# Patient Record
Sex: MALE | Race: WHITE
[De-identification: names, ages, dates, MRNs, and addresses within clinical notes are randomized per-mention and may not be internally consistent; named-entity substitution may affect disease eponyms.]

---

## 2019-03-11 ENCOUNTER — HOSPITAL ENCOUNTER (EMERGENCY)
Dept: HOSPITAL 87 - ER | Age: 64
Discharge: HOME | End: 2019-03-11
Payer: COMMERCIAL

## 2019-03-11 VITALS — SYSTOLIC BLOOD PRESSURE: 140 MMHG | DIASTOLIC BLOOD PRESSURE: 64 MMHG

## 2019-03-11 VITALS — WEIGHT: 211.64 LBS | BODY MASS INDEX: 28.67 KG/M2 | HEIGHT: 72 IN

## 2019-03-11 DIAGNOSIS — E04.1: ICD-10-CM

## 2019-03-11 DIAGNOSIS — R03.0: ICD-10-CM

## 2019-03-11 DIAGNOSIS — J18.9: Primary | ICD-10-CM

## 2019-03-11 DIAGNOSIS — E78.5: ICD-10-CM

## 2019-03-11 LAB
APPEARANCE UR: CLEAR
APTT PPP: 27.3 SEC (ref 23.4–31)
BASOPHILS NFR BLD AUTO: 0.4 % (ref 0–2)
CHLORIDE SERPL-SCNC: 106 MEQ/L (ref 98–107)
COLOR UR: YELLOW
D DIMER PPP FEU-MCNC: 0.72 MG/L FEU (ref ?–0.5)
EOSINOPHIL NFR BLD AUTO: 1.9 % (ref 0–5)
ERYTHROCYTE [DISTWIDTH] IN BLOOD BY AUTOMATED COUNT: 13.5 % (ref 11.6–14.6)
HCT VFR BLD AUTO: 44.2 % (ref 42–52)
HGB BLD-MCNC: 14.9 G/DL (ref 14–18)
HGB UR QL STRIP: NEGATIVE
INR PPP: 1
KETONES UR STRIP-MCNC: NEGATIVE MG/DL
LEUKOCYTE ESTERASE UR QL STRIP: NEGATIVE
LYMPHOCYTES NFR BLD AUTO: 20.5 % (ref 20–50)
MCH RBC QN AUTO: 31.1 PG (ref 28–32)
MCV RBC AUTO: 92.1 FL (ref 80–94)
MONOCYTES NFR BLD AUTO: 9.8 % (ref 2–8)
NEUTROPHILS NFR BLD AUTO: 67.4 % (ref 40–76)
NITRITE UR QL STRIP: NEGATIVE
PH UR STRIP: 5 [PH] (ref 4.5–8)
PLATELET # BLD AUTO: 155 X1000/UL (ref 130–400)
PMV BLD AUTO: 9 FL (ref 7.4–10.4)
PROT UR QL STRIP: NEGATIVE
PROTHROMBIN TIME: 9.6 SEC (ref 9.1–11.1)
RBC # BLD AUTO: 4.79 MILL/UL (ref 4.7–6.1)
SP GR UR STRIP: 1.01 (ref 1–1.03)
UROBILINOGEN UR STRIP-MCNC: 0.2 E.U./DL (ref 0.2–1)

## 2019-03-11 PROCEDURE — 85025 COMPLETE CBC W/AUTO DIFF WBC: CPT

## 2019-03-11 PROCEDURE — 93005 ELECTROCARDIOGRAM TRACING: CPT

## 2019-03-11 PROCEDURE — 36415 COLL VENOUS BLD VENIPUNCTURE: CPT

## 2019-03-11 PROCEDURE — 71045 X-RAY EXAM CHEST 1 VIEW: CPT

## 2019-03-11 PROCEDURE — 99284 EMERGENCY DEPT VISIT MOD MDM: CPT

## 2019-03-11 PROCEDURE — 74176 CT ABD & PELVIS W/O CONTRAST: CPT

## 2019-03-11 PROCEDURE — 93970 EXTREMITY STUDY: CPT

## 2019-03-11 PROCEDURE — 85730 THROMBOPLASTIN TIME PARTIAL: CPT

## 2019-03-11 PROCEDURE — 96374 THER/PROPH/DIAG INJ IV PUSH: CPT

## 2019-03-11 PROCEDURE — 85610 PROTHROMBIN TIME: CPT

## 2019-03-11 PROCEDURE — 71275 CT ANGIOGRAPHY CHEST: CPT

## 2019-03-11 PROCEDURE — 80053 COMPREHEN METABOLIC PANEL: CPT

## 2019-03-11 PROCEDURE — 83880 ASSAY OF NATRIURETIC PEPTIDE: CPT

## 2019-03-11 PROCEDURE — 85379 FIBRIN DEGRADATION QUANT: CPT

## 2019-03-11 PROCEDURE — 81003 URINALYSIS AUTO W/O SCOPE: CPT

## 2019-03-11 PROCEDURE — 84484 ASSAY OF TROPONIN QUANT: CPT

## 2024-08-19 ENCOUNTER — NON-APPOINTMENT (OUTPATIENT)
Age: 69
End: 2024-08-19

## 2024-08-20 ENCOUNTER — EMERGENCY (EMERGENCY)
Facility: HOSPITAL | Age: 69
LOS: 0 days | Discharge: HOME | End: 2024-08-20
Attending: STUDENT IN AN ORGANIZED HEALTH CARE EDUCATION/TRAINING PROGRAM
Payer: COMMERCIAL

## 2024-08-20 VITALS
DIASTOLIC BLOOD PRESSURE: 76 MMHG | RESPIRATION RATE: 18 BRPM | OXYGEN SATURATION: 99 % | TEMPERATURE: 98 F | SYSTOLIC BLOOD PRESSURE: 170 MMHG | HEART RATE: 58 BPM

## 2024-08-20 VITALS
TEMPERATURE: 98 F | SYSTOLIC BLOOD PRESSURE: 164 MMHG | DIASTOLIC BLOOD PRESSURE: 79 MMHG | WEIGHT: 231.71 LBS | OXYGEN SATURATION: 97 % | RESPIRATION RATE: 17 BRPM | HEART RATE: 61 BPM

## 2024-08-20 DIAGNOSIS — L02.11 CUTANEOUS ABSCESS OF NECK: ICD-10-CM

## 2024-08-20 PROCEDURE — 99284 EMERGENCY DEPT VISIT MOD MDM: CPT | Mod: 25

## 2024-08-20 PROCEDURE — 10060 I&D ABSCESS SIMPLE/SINGLE: CPT

## 2024-08-20 PROCEDURE — 10061 I&D ABSCESS COMP/MULTIPLE: CPT

## 2024-08-20 PROCEDURE — 99283 EMERGENCY DEPT VISIT LOW MDM: CPT | Mod: 25

## 2024-08-20 RX ORDER — OXYCODONE HYDROCHLORIDE 30 MG/1
1 TABLET ORAL
Qty: 9 | Refills: 0
Start: 2024-08-20 | End: 2024-08-22

## 2024-08-20 NOTE — ED ADULT NURSE NOTE - OBJECTIVE STATEMENT
Pt presents to ED for neck cyst. pt was seen at urgent care and is on PO abx. Denies fevers/ chills.AxOx4, ambulatory

## 2024-08-20 NOTE — ED STATDOCS - CARE PROVIDER_API CALL
Sid Wilson  [address], [address] [address]  Phone: [phone]  Fax: [phone]  Follow Up Time:

## 2024-08-20 NOTE — ED ADULT TRIAGE NOTE - CHIEF COMPLAINT QUOTE
PT comes to the ED for neck cyst. pt was seen at urgent care and is on abx wants it checked out. denies fevers/ chills. no other complaints at this time. VAL

## 2024-08-20 NOTE — ED STATDOCS - PATIENT PORTAL LINK FT
You can access the FollowMyHealth Patient Portal offered by [facility] by registering at the following website: [email or web address]. By joining [facility]’s FollowMyHealth portal, you will also be able to view your health information using other applications (apps) compatible with our system.

## 2024-08-20 NOTE — ED STATDOCS - NSFOLLOWUPINSTRUCTIONS_ED_ALL_ED_FT
Abscess Incision and Drainage    WHAT YOU NEED TO KNOW:    What do I need to know about an abscess incision and drainage? An abscess incision and drainage (I and D) is a procedure to drain pus from an abscess and clean it out so it can heal.    How do I prepare for an I and D? Your healthcare provider will talk to you about how to prepare for an I and D. He or she will tell you what medicines to take or not take on the day of your I and D.    What will happen during an I and D? You will be given local or regional anesthesia to numb the area and keep you free from pain. Your healthcare provider will make an incision in your skin near or over the abscess. He or she will press on the area to drain the pus. A cotton swab or other medical tool wrapped in gauze may be used to clean the inside of the abscess. Your healthcare provider may wash the wound with saline (salt water). He or she may place plain gauze or gauze with medicine in your wound to help it heal. A dry bandage will be placed over your wound. You may be given antibiotics to treat the infection.    What are the risks of an I and D? A scar may form on your skin as it heals. Your incision may heal slowly, feel painful, or get infected. Your abscess may come back, even after treatment. You may need another I and D if the abscess comes back. The bacteria may spread to your heart or other organs. This can be life-threatening.    CARE AGREEMENT:    You have the right to help plan your care. Learn about your health condition and how it may be treated. Discuss treatment options with your healthcare providers to decide what care you want to receive. You always have the right to refuse treatment.

## 2024-08-20 NOTE — ED STATDOCS - PROGRESS NOTE DETAILS
Fluctuant 68 year old male with PMHx of HLD on Crestor; presents to ED c/o neck abscess. Patient reports hx of cyst in the same area many years ago, had it removed and has not caused issues for some time. Patient prescribed Doxycycline x1 week for cyst. Denies fever.  Petra Milligan PA-C Fluctuant abscess inferior neck.  I&D. Petra Milligan PA-C

## 2024-08-20 NOTE — ED STATDOCS - CLINICAL SUMMARY MEDICAL DECISION MAKING FREE TEXT BOX
68-year-old male with past medical history of hypertension presents ED complaining of possible abscess to the right lateral posterior neck.  Patient's had something similar in the past and had a removed.  Patient was seen in urgent care in [place] and prescribed doxycycline but did not have it drained at that time.  Patient on exam has had area of approximately 1 x 1 cm with tenderness, induration, fluctuance concerning for abscess.  Will perform incision and drainage and discharge patient to follow-up with dermatology.

## 2024-08-20 NOTE — ED STATDOCS - OBJECTIVE STATEMENT
68 year old male with PMHx of HLD on Crestor; presents to ED c/o neck abscess. Patient reports hx of cyst in the same area many years ago, had it removed and has not caused issues for some time. Patient prescribed Doxycycline x1 week for cyst. Denies fever.

## 2024-08-20 NOTE — ED STATDOCS - PHYSICAL EXAMINATION
GEN: Patient awake alert NAD.   HEENT: normocephalic, atraumatic, EOMI, no scleral icterus, moist MM. 1x1 cm abscess on posterolateral neck with mild surrounding erythema, tenderness, fluctuance and induration   CARDIAC: RRR, S1, S2, no murmur.   PULM: CTA B/L no wheeze, rhonchi, rales.   ABD: soft NT, ND, no rebound no guarding, no CVA tenderness.   MSK: Moving all extremities, no edema. 5/5 strength and full ROM in all extremities.     NEURO: A&Ox3, gait normal, no focal neurological deficits, CN 2-12 grossly intact  SKIN: warm, dry, no rash. GEN: Patient awake alert NAD.   HEENT: normocephalic, atraumatic, EOMI, no scleral icterus, moist MM. 1x1 cm abscess on posterolateral neck with mild surrounding erythema, tenderness, fluctuance and induration   CARDIAC: RRR, S1, S2, no murmur.   PULM: CTA B/L no wheeze, rhonchi, rales.   ABD: soft NT, ND, no rebound no guarding, no CVA tenderness.   MSK: Moving all extremities, no edema. 5/5 strength and full ROM in all extremities.     NEURO: A&Ox3, gait normal, no focal neurological deficits, CN 2-12 grossly intact  SKIN: warm, dry, GEN: Patient awake alert NAD.   HEENT: normocephalic, atraumatic, EOMI, no scleral icterus, moist MM. 1x1 cm abscess on posterolateral neck with mild surrounding erythema, tenderness, fluctuance and induration   CARDIAC: RRR, S1, S2, no murmur.   PULM: CTA B/L no wheeze, rhonchi, rales.   ABD: soft NT, ND, no rebound no guarding  MSK: Moving all extremities, no edema. 5/5 strength and full ROM in all extremities.     NEURO: A&Ox3, gait normal, no focal neurological deficits  SKIN: warm, dry,

## 2024-08-21 ENCOUNTER — NON-APPOINTMENT (OUTPATIENT)
Age: 69
End: 2024-08-21

## 2024-08-21 PROBLEM — Z00.00 ENCOUNTER FOR PREVENTIVE HEALTH EXAMINATION: Status: ACTIVE | Noted: 2024-08-21

## 2024-08-22 ENCOUNTER — NON-APPOINTMENT (OUTPATIENT)
Age: 69
End: 2024-08-22

## 2024-08-22 ENCOUNTER — APPOINTMENT (OUTPATIENT)
Dept: DERMATOLOGY | Facility: CLINIC | Age: 69
End: 2024-08-22
Payer: COMMERCIAL

## 2024-08-22 VITALS — WEIGHT: 214 LBS | BODY MASS INDEX: 28.99 KG/M2 | HEIGHT: 72 IN

## 2024-08-22 DIAGNOSIS — L72.3 SEBACEOUS CYST: ICD-10-CM

## 2024-08-22 DIAGNOSIS — Z85.820 PERSONAL HISTORY OF MALIGNANT MELANOMA OF SKIN: ICD-10-CM

## 2024-08-22 DIAGNOSIS — Z78.9 OTHER SPECIFIED HEALTH STATUS: ICD-10-CM

## 2024-08-22 PROCEDURE — 99204 OFFICE O/P NEW MOD 45 MIN: CPT

## 2024-08-22 RX ORDER — KETOCONAZOLE 20.5 MG/ML
2 SHAMPOO, SUSPENSION TOPICAL
Refills: 0 | Status: ACTIVE | COMMUNITY

## 2024-08-22 RX ORDER — ROSUVASTATIN CALCIUM 10 MG/1
10 TABLET, FILM COATED ORAL
Refills: 0 | Status: ACTIVE | COMMUNITY

## 2024-08-22 RX ORDER — KETOCONAZOLE 20 MG/G
2 CREAM TOPICAL
Refills: 0 | Status: ACTIVE | COMMUNITY

## 2024-08-22 NOTE — PHYSICAL EXAM
[FreeTextEntry3] : Cystic nodule - base of the right posterior neck, with crusted packing on the surface.

## 2024-08-22 NOTE — HISTORY OF PRESENT ILLNESS
[FreeTextEntry1] : Patient sent by ER for lesion on the posterior neck. [de-identified] : He had a tender and growing cyst, that was drained and packed in the ER 1 week ago.

## 2024-08-22 NOTE — ASSESSMENT
[FreeTextEntry1] : Irritated EIC s/p I&D in ER. Complete antibiotics as given in ER (? name of medication). Dry dressing encouraged, clean at least daily in the shower, removing any crusting. f/u in 3 weeks for evaluation.  If there is recurrent cyst, will then schedule for excision.

## 2024-09-10 ENCOUNTER — APPOINTMENT (OUTPATIENT)
Dept: DERMATOLOGY | Facility: CLINIC | Age: 69
End: 2024-09-10
Payer: COMMERCIAL

## 2024-09-10 DIAGNOSIS — L72.3 SEBACEOUS CYST: ICD-10-CM

## 2024-09-10 PROCEDURE — 99213 OFFICE O/P EST LOW 20 MIN: CPT

## 2024-09-10 NOTE — HISTORY OF PRESENT ILLNESS
[FreeTextEntry1] : Irritated EIC of the right posterior neck. [de-identified] : Improved, as it healed, but not resolved.

## 2024-09-10 NOTE — PHYSICAL EXAM
[Alert] : alert [Oriented x 3] : ~L oriented x 3 [Well Nourished] : well nourished [FreeTextEntry3] : Small erythematous cystic nodule.

## 2024-09-18 ENCOUNTER — NON-APPOINTMENT (OUTPATIENT)
Age: 69
End: 2024-09-18

## 2024-09-18 ENCOUNTER — APPOINTMENT (OUTPATIENT)
Dept: FAMILY MEDICINE | Facility: CLINIC | Age: 69
End: 2024-09-18
Payer: COMMERCIAL

## 2024-09-18 VITALS
OXYGEN SATURATION: 97 % | DIASTOLIC BLOOD PRESSURE: 74 MMHG | BODY MASS INDEX: 30.88 KG/M2 | SYSTOLIC BLOOD PRESSURE: 124 MMHG | WEIGHT: 228 LBS | HEIGHT: 72 IN | HEART RATE: 59 BPM | TEMPERATURE: 97.6 F

## 2024-09-18 DIAGNOSIS — M25.511 PAIN IN RIGHT SHOULDER: ICD-10-CM

## 2024-09-18 DIAGNOSIS — Z85.820 PERSONAL HISTORY OF MALIGNANT MELANOMA OF SKIN: ICD-10-CM

## 2024-09-18 DIAGNOSIS — Z00.00 ENCOUNTER FOR GENERAL ADULT MEDICAL EXAMINATION W/OUT ABNORMAL FINDINGS: ICD-10-CM

## 2024-09-18 DIAGNOSIS — E78.5 HYPERLIPIDEMIA, UNSPECIFIED: ICD-10-CM

## 2024-09-18 PROCEDURE — 99387 INIT PM E/M NEW PAT 65+ YRS: CPT

## 2024-09-18 PROCEDURE — 81003 URINALYSIS AUTO W/O SCOPE: CPT | Mod: QW

## 2024-09-18 RX ORDER — ROSUVASTATIN CALCIUM 20 MG/1
20 TABLET, FILM COATED ORAL DAILY
Qty: 90 | Refills: 1 | Status: ACTIVE | COMMUNITY
Start: 2024-09-18

## 2024-09-18 NOTE — PHYSICAL EXAM
[No Acute Distress] : no acute distress [Well Nourished] : well nourished [Well Developed] : well developed [Well-Appearing] : well-appearing [Normal] : no acute distress, well nourished, well developed and well-appearing [Normal Sclera/Conjunctiva] : normal sclera/conjunctiva [PERRL] : pupils equal round and reactive to light [EOMI] : extraocular movements intact [Normal Outer Ear/Nose] : the outer ears and nose were normal in appearance [Normal Oropharynx] : the oropharynx was normal [No JVD] : no jugular venous distention [No Lymphadenopathy] : no lymphadenopathy [Supple] : supple [Thyroid Normal, No Nodules] : the thyroid was normal and there were no nodules present [No Respiratory Distress] : no respiratory distress  [No Accessory Muscle Use] : no accessory muscle use [Clear to Auscultation] : lungs were clear to auscultation bilaterally [Normal Rate] : normal rate  [Regular Rhythm] : with a regular rhythm [Normal S1, S2] : normal S1 and S2 [No Murmur] : no murmur heard [No Carotid Bruits] : no carotid bruits [No Abdominal Bruit] : a ~M bruit was not heard ~T in the abdomen [No Varicosities] : no varicosities [Pedal Pulses Present] : the pedal pulses are present [No Edema] : there was no peripheral edema [No Palpable Aorta] : no palpable aorta [No Extremity Clubbing/Cyanosis] : no extremity clubbing/cyanosis [Soft] : abdomen soft [Non Tender] : non-tender [Non-distended] : non-distended [No Masses] : no abdominal mass palpated [No HSM] : no HSM [Normal Bowel Sounds] : normal bowel sounds [Normal Posterior Cervical Nodes] : no posterior cervical lymphadenopathy [Normal Anterior Cervical Nodes] : no anterior cervical lymphadenopathy [No CVA Tenderness] : no CVA  tenderness [No Spinal Tenderness] : no spinal tenderness [No Joint Swelling] : no joint swelling [Grossly Normal Strength/Tone] : grossly normal strength/tone [No Rash] : no rash [Coordination Grossly Intact] : coordination grossly intact [No Focal Deficits] : no focal deficits [Normal Gait] : normal gait [Deep Tendon Reflexes (DTR)] : deep tendon reflexes were 2+ and symmetric [Normal Affect] : the affect was normal [Normal Insight/Judgement] : insight and judgment were intact [FreeTextEntry1] : Right 0.5-40 1-40 2-40 4-30   Left 0.5-30 1-35 2-30 4- 50

## 2024-09-18 NOTE — PLAN
[FreeTextEntry1] : colo done this year - diverticulosis, follows fiber diet  stool test next year  Extensive counseling provided on lifestyle modifications (healthy diet, daily exercise, routine screenings).  patient routinely follows with derm every 3 months - hx of melanoma right ear lobe  Return for CPE in 1 year.

## 2024-09-18 NOTE — HISTORY OF PRESENT ILLNESS
[FreeTextEntry1] : New pt is here for CPE. [de-identified] : patient also lives in [place] for 6 mo of the year  has PCP and derm in [place]

## 2024-09-24 LAB
ALBUMIN SERPL ELPH-MCNC: 4.4 G/DL
ALP BLD-CCNC: 73 U/L
ALT SERPL-CCNC: 21 U/L
ANION GAP SERPL CALC-SCNC: 13 MMOL/L
AST SERPL-CCNC: 22 U/L
BILIRUB SERPL-MCNC: 0.2 MG/DL
BILIRUB UR QL STRIP: NORMAL
BUN SERPL-MCNC: 15 MG/DL
CALCIUM SERPL-MCNC: 9.7 MG/DL
CHLORIDE SERPL-SCNC: 103 MMOL/L
CHOLEST SERPL-MCNC: 191 MG/DL
CO2 SERPL-SCNC: 26 MMOL/L
CREAT SERPL-MCNC: 0.99 MG/DL
EGFR: 83 ML/MIN/1.73M2
ESTIMATED AVERAGE GLUCOSE: 114 MG/DL
GLUCOSE SERPL-MCNC: 105 MG/DL
GLUCOSE UR-MCNC: NORMAL
HBA1C MFR BLD HPLC: 5.6 %
HCG UR QL: 0.2 EU/DL
HCT VFR BLD CALC: 47.3 %
HDLC SERPL-MCNC: 65 MG/DL
HGB BLD-MCNC: 15 G/DL
HGB UR QL STRIP.AUTO: NORMAL
KETONES UR-MCNC: NORMAL
LDLC SERPL CALC-MCNC: 98 MG/DL
LEUKOCYTE ESTERASE UR QL STRIP: NORMAL
MCHC RBC-ENTMCNC: 30.5 PG
MCHC RBC-ENTMCNC: 31.7 GM/DL
MCV RBC AUTO: 96.3 FL
NITRITE UR QL STRIP: NORMAL
NONHDLC SERPL-MCNC: 126 MG/DL
PH UR STRIP: 5.5
PLATELET # BLD AUTO: 178 K/UL
POTASSIUM SERPL-SCNC: 4.5 MMOL/L
PROT SERPL-MCNC: 7.1 G/DL
PROT UR STRIP-MCNC: NORMAL
PSA FREE FLD-MCNC: 20 %
PSA FREE SERPL-MCNC: 0.14 NG/ML
PSA SERPL-MCNC: 0.69 NG/ML
RBC # BLD: 4.91 M/UL
RBC # FLD: 14.1 %
SODIUM SERPL-SCNC: 142 MMOL/L
SP GR UR STRIP: 1.02
T3FREE SERPL-MCNC: 2.55 PG/ML
T4 FREE SERPL-MCNC: 1.4 NG/DL
TRIGL SERPL-MCNC: 162 MG/DL
TSH SERPL-ACNC: 1.49 UIU/ML
WBC # FLD AUTO: 5.05 K/UL

## 2024-09-25 ENCOUNTER — TRANSCRIPTION ENCOUNTER (OUTPATIENT)
Age: 69
End: 2024-09-25

## 2024-10-24 ENCOUNTER — APPOINTMENT (OUTPATIENT)
Dept: DERMATOLOGY | Facility: CLINIC | Age: 69
End: 2024-10-24
Payer: COMMERCIAL

## 2024-10-24 VITALS — BODY MASS INDEX: 29.26 KG/M2 | HEIGHT: 72 IN | WEIGHT: 216 LBS

## 2024-10-24 DIAGNOSIS — L91.0 HYPERTROPHIC SCAR: ICD-10-CM

## 2024-10-24 DIAGNOSIS — L72.3 SEBACEOUS CYST: ICD-10-CM

## 2024-10-24 PROCEDURE — 11900 INJECT SKIN LESIONS </W 7: CPT

## 2024-10-24 PROCEDURE — 99213 OFFICE O/P EST LOW 20 MIN: CPT | Mod: 25

## 2025-05-16 NOTE — ED ADULT TRIAGE NOTE - INTERNATIONAL TRAVEL
[Dear  ___] : Dear  [unfilled], [Consult Letter:] : I had the pleasure of evaluating your patient, [unfilled]. [Please see my note below.] : Please see my note below. [Consult Closing:] : Thank you very much for allowing me to participate in the care of this patient.  If you have any questions, please do not hesitate to contact me. [Sincerely,] : Sincerely, [FreeTextEntry3] : Yani Scales MD Pediatric Dermatology [facility] No